# Patient Record
Sex: FEMALE | Race: WHITE | NOT HISPANIC OR LATINO | URBAN - NONMETROPOLITAN AREA
[De-identification: names, ages, dates, MRNs, and addresses within clinical notes are randomized per-mention and may not be internally consistent; named-entity substitution may affect disease eponyms.]

---

## 2025-08-09 ENCOUNTER — OFFICE VISIT (OUTPATIENT)
Dept: URGENT CARE | Facility: URGENT CARE | Age: 50
End: 2025-08-09
Payer: OTHER GOVERNMENT

## 2025-08-09 VITALS
BODY MASS INDEX: 36.62 KG/M2 | HEART RATE: 92 BPM | RESPIRATION RATE: 19 BRPM | OXYGEN SATURATION: 97 % | WEIGHT: 200 LBS | SYSTOLIC BLOOD PRESSURE: 129 MMHG | TEMPERATURE: 98.8 F | DIASTOLIC BLOOD PRESSURE: 97 MMHG

## 2025-08-09 DIAGNOSIS — L03.119 CELLULITIS OF LOWER EXTREMITY, UNSPECIFIED LATERALITY: Primary | ICD-10-CM

## 2025-08-09 RX ORDER — SULFAMETHOXAZOLE AND TRIMETHOPRIM 800; 160 MG/1; MG/1
1 TABLET ORAL 2 TIMES DAILY
Qty: 20 TABLET | Refills: 0 | Status: SHIPPED | OUTPATIENT
Start: 2025-08-09 | End: 2025-08-19

## 2025-08-09 ASSESSMENT — ENCOUNTER SYMPTOMS
APPETITE CHANGE: 0
FEVER: 0
VOMITING: 0
NAUSEA: 0
ACTIVITY CHANGE: 0
FATIGUE: 0

## 2025-08-09 ASSESSMENT — VISUAL ACUITY: OU: 1

## 2025-08-09 NOTE — PATIENT INSTRUCTIONS
Take antibiotic as prescribed.  Keep areas clean and dry.  May apply Neosporin as needed.    Please drink plenty of fluids and get plenty of rest.   If no improvement AT ALL within 48 hours, please feel free to come back for re-evaluation or go to another healthcare facility.   Over-the-counter medications may be used to treat symptoms.   If you experience worsening symptoms, chest pain, or shortness of breath, please proceed to the nearest Emergency Room.

## 2025-08-09 NOTE — PROGRESS NOTES
"Subjective   Patient ID: Patsy Eagle is a 50 y.o. female. They present today with a chief complaint of Possible infection (Pt states possible infection on both upper thighs. Thinks it may be from an ingrown hairs).    History of Present Illness  50-year-old female with no significant past medical history presents with complaints of what she thinks is an ingrown hair on the left medial thigh and right posterior thigh.  States both of the areas she noticed about a week ago.  At that time they were just \"bumps\" with redness.  The left one has progressed and gotten worse in severity since then.  1 on the right posterior thigh has not progressed as much.  Patient states her  \"poked and prodded\" yesterday and did get some discharge from the site.  Patient woke up this morning and the spot on the left medial thigh is very tender, warm to touch, hard and swollen.    Denies fevers, chills, nausea, vomiting.  Has applied Neosporin without relief.      History provided by:  Patient   used: No        Past Medical History  Allergies as of 08/09/2025    (No Known Allergies)       Prescriptions Prior to Admission[1]     Medical History[2]    Surgical History[3]     reports that she has never smoked. She has never used smokeless tobacco.    Review of Systems  Review of Systems   Constitutional:  Negative for activity change, appetite change, fatigue and fever.   Gastrointestinal:  Negative for nausea and vomiting.   Skin:  Positive for rash.                                  Objective    Vitals:    08/09/25 1047   BP: (!) 129/97   BP Location: Left arm   Patient Position: Sitting   BP Cuff Size: Adult long   Pulse: 92   Resp: 19   Temp: 37.1 °C (98.8 °F)   TempSrc: Oral   SpO2: 97%   Weight: 90.7 kg (200 lb)     No LMP recorded (lmp unknown). Patient has had an ablation.    Physical Exam  Vitals and nursing note reviewed.   Constitutional:       Appearance: Normal appearance.     Eyes:      General: " Vision grossly intact.       Cardiovascular:      Rate and Rhythm: Normal rate and regular rhythm.   Pulmonary:      Effort: Pulmonary effort is normal.      Breath sounds: Normal breath sounds.     Skin:          Comments: Indurated abscess to right posterior thigh--does not appear to be able to be drained.  No drainage visualized during exam.  Mild tenderness with palpation.  Mild surrounding erythema.    Indurated abscess left medial thigh--area moderately tender with palpation.  Surrounding skin erythematous and warm to touch.  No drainage visualized.         Neurological:      Mental Status: She is alert and oriented to person, place, and time.      GCS: GCS eye subscore is 4. GCS verbal subscore is 5. GCS motor subscore is 6.         Procedures    Point of Care Test & Imaging Results from this visit  No results found for this visit on 08/09/25.   Imaging  No results found.    Cardiology, Vascular, and Other Imaging  No other imaging results found for the past 2 days      Diagnostic study results (if any) were reviewed by RADHA Jacobs.    Assessment/Plan   Allergies, medications, history, and pertinent labs/EKGs/Imaging reviewed by RADHA Jacobs.     Medical Decision Making  HPI: See Narrative Above   HISTORIAN: Patient   INDEPENDENT HISTORIAN:   RECORDS REVIEWED:    DIFFERENTIAL DX: Ingrown hair versus abscess versus cellulitis  LABS:  XRAY:  EKG:    IN CLINIC MEDICATIONS: Offered in clinic RX medications for patient they declined stated they wanted it sent through their insurance through their pharmacy.      CONSULTED WITH:    DIAGNOSIS: See urgent care course of treatment  SEE URGENT CARE COURSE OF TREATMENT AND DOCUMENTATION FOR RX MEDS GIVEN.     PROCEDURES: SEE PROCEDURE NOTE    PLAN: Will treat as cellulitis with Bactrim twice a day for 10 days.  Advised to keep areas clean and dry.  May use Neosporin as needed.  Follow-up in 48 hours if symptoms have not improved or if they  have gotten worse.  Emergency department if symptoms are severe or she develops fevers, chills, chest pain, shortness of breath.  Patient agreeable to plan    Patient and or family were counselled on labs, radiological studies, and all testing applicable for this visit as well as diagnosis. Patient was discharged home with stable non-toxic condition. They verbalized discharge instructions that were given to them BOTH written and verbally by myself.  They were given ample time to ask questions and have none at time of disposition.    Orders and Diagnoses  There are no diagnoses linked to this encounter.    Medical Admin Record      Patient disposition: Home    Electronically signed by RADHA Jacobs  10:53 AM           [1] (Not in a hospital admission)  [2]   Past Medical History:  Diagnosis Date    Other conditions influencing health status     No significant past medical history   [3] No past surgical history on file.